# Patient Record
Sex: FEMALE | Race: ASIAN | NOT HISPANIC OR LATINO | Employment: OTHER | URBAN - METROPOLITAN AREA
[De-identification: names, ages, dates, MRNs, and addresses within clinical notes are randomized per-mention and may not be internally consistent; named-entity substitution may affect disease eponyms.]

---

## 2019-06-14 ENCOUNTER — OFFICE VISIT (OUTPATIENT)
Dept: URGENT CARE | Facility: CLINIC | Age: 74
End: 2019-06-14
Payer: MEDICARE

## 2019-06-14 ENCOUNTER — TELEPHONE (OUTPATIENT)
Dept: URGENT CARE | Facility: CLINIC | Age: 74
End: 2019-06-14

## 2019-06-14 VITALS
DIASTOLIC BLOOD PRESSURE: 94 MMHG | TEMPERATURE: 99 F | HEIGHT: 58 IN | BODY MASS INDEX: 22.88 KG/M2 | WEIGHT: 109 LBS | SYSTOLIC BLOOD PRESSURE: 218 MMHG | HEART RATE: 70 BPM | OXYGEN SATURATION: 98 %

## 2019-06-14 DIAGNOSIS — Z76.89 ENCOUNTER TO ESTABLISH CARE: ICD-10-CM

## 2019-06-14 DIAGNOSIS — I10 HYPERTENSION, UNSPECIFIED TYPE: ICD-10-CM

## 2019-06-14 DIAGNOSIS — M79.602 LEFT ARM PAIN: ICD-10-CM

## 2019-06-14 DIAGNOSIS — M77.02 MEDIAL EPICONDYLITIS OF LEFT ELBOW: ICD-10-CM

## 2019-06-14 DIAGNOSIS — S60.222A CONTUSION OF LEFT HAND, INITIAL ENCOUNTER: ICD-10-CM

## 2019-06-14 DIAGNOSIS — S69.92XA HAND INJURY, LEFT, INITIAL ENCOUNTER: Primary | ICD-10-CM

## 2019-06-14 PROCEDURE — 73130 X-RAY EXAM OF HAND: CPT | Mod: FY,LT,S$GLB, | Performed by: RADIOLOGY

## 2019-06-14 PROCEDURE — 99203 OFFICE O/P NEW LOW 30 MIN: CPT | Mod: S$GLB,,, | Performed by: PHYSICIAN ASSISTANT

## 2019-06-14 PROCEDURE — 73130 XR HAND COMPLETE 3 VIEW LEFT: ICD-10-PCS | Mod: FY,LT,S$GLB, | Performed by: RADIOLOGY

## 2019-06-14 PROCEDURE — 73080 XR ELBOW COMPLETE 3 VIEW LEFT: ICD-10-PCS | Mod: FY,LT,S$GLB, | Performed by: RADIOLOGY

## 2019-06-14 PROCEDURE — 99203 PR OFFICE/OUTPT VISIT, NEW, LEVL III, 30-44 MIN: ICD-10-PCS | Mod: S$GLB,,, | Performed by: PHYSICIAN ASSISTANT

## 2019-06-14 PROCEDURE — 73080 X-RAY EXAM OF ELBOW: CPT | Mod: FY,LT,S$GLB, | Performed by: RADIOLOGY

## 2019-06-14 RX ORDER — MULTIVITAMIN
1 TABLET ORAL DAILY
COMMUNITY

## 2019-06-14 NOTE — PATIENT INSTRUCTIONS
Discharge Instructions: Taking Your Blood Pressure  Blood pressure is the force of blood as it moves from the heart through the blood vessels. You can take your own blood pressure reading using a digital monitor. Take readings as often as your healthcare provider instructs. Take your readings each time in the same way, using the same arm. Here are guidelines for taking your blood pressure.  The American Heart Association (AHA) recommends purchasing a blood pressure monitor that is validated and approved by the Association for the Advancement of Medical Instrumentation, the [place] Hypertension Society, and the International Protocol for the Validation of Automated BP Measuring Devices. If the blood pressure monitor is for a senior adult, a pregnant woman, or a child, make certain it is validated for use with such a population. For the most reliable readings, the AHA recommends an automatic, cuff-style, upper arm (bicep) monitor. The readings from finger and wrist monitors are not as reliable as the upper arm monitor.        Step 1. Relax    · Wait at least a half hour after smoking, eating, or exercising. Do not drink coffee, tea, soda, or other caffeinated beverages before checking your blood pressure.   · Sit comfortably at a table. Place the monitor near you.  · Rest for a few minutes before you begin.        Step 2. Wrap the cuff    · Place your arm on the table, palm up. Put your arm in a position that is level with your heart. Wrap the cuff around your upper arm, about an inch above your elbow. Its best to wrap the cuff on bare skin, not over clothing.  · Make sure your cuff fits. If it doesnt wrap around your upper arm, order a larger cuff. A cuff that is too large or too small can result in an inaccurate blood pressure reading.           Step 3. Inflate the cuff    · Pump the cuff until the scale reads 200. If you have a self-inflating cuff, push the button that starts the pump.  · The cuff will  tighten, then loosen.  · The numbers will change. When they stop changing, your blood pressure reading will appear.  · If you get a reading that is too high or too low for you, relax for a few minutes. Then do the test again.    Step 4. Write down the results  · Write down your blood pressure numbers. Paras the date and time. Keep your results in one place, such as a notebook.  · Remove the cuff from your arm. Turn off the machine.  · Take the readings with you to your medical appointments.  · If you start a new blood pressure medicine, or change a blood pressure medicine dose, note the day you started the new drug or dosage on your blood pressure recording sheet. This will help your healthcare provider monitor the effect of medication changes.     Date Last Reviewed: 4/27/2016 © 2000-2016 [employer]. [address] [place] [address]. All rights reserved. This information is not intended as a substitute for professional medical care. Always follow your healthcare professional's instructions.        Hand Contusion  You have a contusion. This is also called a bruise. There is swelling and some bleeding under the skin, but no broken bones. This injury generally takes a few days to a few weeks to heal.  During that time, the bruise will typically change in color from reddish, to purple-blue, to greenish-yellow, then to yellow-brown.  Home care  · Elevate the hand to reduce pain and swelling. As much as possible, sit or lie down with the hand raised about the level of your heart. This is especially important during the first 48 hours.  · Ice the hand to help reduce pain and swelling. Wrap a cold source (ice pack or ice cubes in a plastic bag) in a thin towel. Apply to the bruised area for 20 minutes every 1 to 2 hours the first day. Continue this 3 to 4 times a day until the pain and swelling goes away.  · Unless another medicine was prescribed, you can take acetaminophen, ibuprofen, or  naproxen to control pain. (If you have chronic liver or kidney disease or ever had a stomach ulcer or gastrointestinal bleeding, talk with your doctor before using these medicines.)  Follow up  Follow up with your healthcare provider or our staff as advised. Call if you are not improving within 1 to 2 weeks.  When to seek medical advice   Call your healthcare provider right away if you have any of the following:  · Increased pain or swelling  · Arm becomes cold, blue, numb or tingly  · Signs of infection: Warmth, drainage, or increased redness or pain around the bruise  · Inability to move the injured hand   · Frequent bruising for unknown reasons  Date Last Reviewed: 2/1/2017 © 2000-2017 [employer]. [address] [place] [address], [place] [address]. All rights reserved. This information is not intended as a substitute for professional medical care. Always follow your healthcare professional's instructions.        Understanding Medial Epicondylitis    Several muscles attach to the arm at the elbow joint. The tough bands of tissue that attach muscle to bones are called tendons. The bone in the upper arm has knobs on the farthest end called epicondyles. Tendons attach some arm muscles to these knobs. The tissues in this area can become irritated.  Epicondylitis is the medical term for a painful elbow over the epicondyle. Medial refers to the inner side of the elbow. Medial epicondylitis is sometimes called golfers elbow.     How to say it  [id number]-benito-alok [id number]   Causes of medial epicondylitis  A painful inner elbow may be caused by:  · Using an elbow or hand the same way over and over  · Using poor form or too much force in a sport such as golf, tennis, or baseball  · Lifting too heavy a weight  · Other injuries to the arm or elbow  Symptoms of medial epicondylitis  · Pain or tenderness on the inside of the elbow that may travel down the forearm  · Pain when moving the wrist  · Pain or  weakness when gripping something  · A crackling sound or grating feeling when moving the elbow  Treatment for medial epicondylitis  Treatments may include:  · Avoiding or changing the action that caused the problem. This helps prevent irritating the tissues more.  · Prescription or over-the-counter pain medicines. These help reduce inflammation, swelling, and pain.  · Cold or heat packs. These help reduce pain and swelling.  · Stretching and other exercises. These improve flexibility and strength.  · Physical therapy. This may include exercises or other treatments.  · Injections of medicine. This may relieve symptoms.  If other treatments do not relieve symptoms, you may need surgery.  Possible complications  If you dont give your elbow time to heal, symptoms may return or get worse. Follow your healthcare providers instructions on resting and treating your elbow.     When to call your healthcare provider  Call your healthcare provider right away if you have any of these:  · Fever of 100.4°F (38°C) or higher, or as directed  · Redness, swelling, or warmth that gets worse  · Symptoms that dont get better with prescribed medicines, or get worse  · New symptoms   Date Last Reviewed: 3/10/2016  © 4579-5576 [employer]. [address] [place] [address]. All rights reserved. This information is not intended as a substitute for professional medical care. Always follow your healthcare professional's instructions.      Keep appointment made with primary care.  ER precautions given.  Patient's daughter and patient verbalized understanding importance of blood pressure monitoring.    Please follow up with your Primary care provider within 2-5 days if your signs and symptoms have not resolved or worsen.     If your condition worsens or fails to improve we recommend that you receive another evaluation at the emergency room immediately or contact your primary medical clinic to discuss your concerns.   You  must understand that you have received an Urgent Care treatment only and that you may be released before all of your medical problems are known or treated. You, the patient, will arrange for follow up care as instructed.     RED FLAGS/WARNING SYMPTOMS DISCUSSED WITH PATIENT THAT WOULD WARRANT EMERGENT MEDICAL ATTENTION. PATIENT VERBALIZED UNDERSTANDING.

## 2019-06-14 NOTE — PROGRESS NOTES
"Subjective:       Patient ID: Jairo Pino is a 74 y.o. female.    Vitals:  height is 4' 10" (1.473 m) and weight is 49.4 kg (109 lb). Her temperature is 98.5 °F (36.9 °C). Her blood pressure is 218/94 (abnormal) and her pulse is 70. Her oxygen saturation is 98%.     Chief Complaint: Arm Injury    Arm Injury    The incident occurred more than 1 week ago (2 Weeks ). The incident occurred at home. The injury mechanism was a fall. The pain is present in the left wrist and left hand. The quality of the pain is described as aching, shooting and cramping. Radiates to: left elbow  The pain is at a severity of 0/10 (7 with movement ). The pain is mild. Pertinent negatives include no chest pain. The symptoms are aggravated by movement. She has tried NSAIDs for the symptoms. The treatment provided moderate relief.       Constitution: Negative for appetite change, chills, sweating, fatigue, fever and generalized weakness.   Neck: Negative for neck pain, neck stiffness and neck swelling.   Cardiovascular: Negative for chest pain, leg swelling, palpitations and sob on exertion.   Eyes: Negative for eye redness and blurred vision.   Respiratory: Negative for cough and shortness of breath.    Gastrointestinal: Negative for abdominal pain, nausea, vomiting, constipation and diarrhea.   Musculoskeletal: Positive for pain, joint pain, joint swelling, muscle cramps and muscle ache.       Objective:      Physical Exam   Constitutional: She is oriented to person, place, and time. She appears well-developed and well-nourished. She is cooperative.  Non-toxic appearance. She does not appear ill. No distress.   HENT:   Head: Normocephalic and atraumatic.   Right Ear: Hearing, tympanic membrane, external ear and ear canal normal.   Left Ear: Hearing, tympanic membrane, external ear and ear canal normal.   Nose: Nose normal. No mucosal edema, rhinorrhea or nasal deformity. No epistaxis. Right sinus exhibits no maxillary sinus tenderness " and no frontal sinus tenderness. Left sinus exhibits no maxillary sinus tenderness and no frontal sinus tenderness.   Mouth/Throat: Uvula is midline, oropharynx is clear and moist and mucous membranes are normal. No trismus in the jaw. Normal dentition. No uvula swelling. No posterior oropharyngeal erythema.   Eyes: Conjunctivae and lids are normal. Right eye exhibits no discharge. Left eye exhibits no discharge. No scleral icterus.   Sclera clear bilat   Neck: Trachea normal, normal range of motion, full passive range of motion without pain and phonation normal. Neck supple.   Cardiovascular: Normal rate, regular rhythm, normal heart sounds, intact distal pulses and normal pulses.   Pulmonary/Chest: Effort normal and breath sounds normal. No respiratory distress.   Abdominal: Soft. Normal appearance and bowel sounds are normal. She exhibits no distension, no pulsatile midline mass and no mass. There is no tenderness.   Musculoskeletal: Normal range of motion. She exhibits no edema or deformity.        Left shoulder: She exhibits normal range of motion, no tenderness, no bony tenderness, no swelling, no effusion, no crepitus, no deformity, no laceration, no pain, no spasm, normal pulse and normal strength.        Left elbow: She exhibits normal range of motion, no swelling, no effusion, no deformity and no laceration. Tenderness found. Medial epicondyle tenderness noted. No radial head, no lateral epicondyle and no olecranon process tenderness noted.        Left wrist: She exhibits normal range of motion, no tenderness, no bony tenderness, no swelling, no effusion, no crepitus, no deformity and no laceration.        Left upper arm: She exhibits no tenderness, no bony tenderness, no swelling, no edema, no deformity and no laceration.        Left forearm: She exhibits no tenderness, no bony tenderness, no swelling, no edema, no deformity and no laceration.        Left hand: She exhibits tenderness, bony tenderness and  swelling. She exhibits normal range of motion, normal two-point discrimination, normal capillary refill, no deformity and no laceration. Normal sensation noted. Decreased sensation is not present in the ulnar distribution, is not present in the medial redistribution and is not present in the radial distribution. Normal strength noted. She exhibits no finger abduction, no thumb/finger opposition and no wrist extension trouble.        Hands:  Neurological: She is alert and oriented to person, place, and time. She exhibits normal muscle tone. Coordination normal.   Skin: Skin is warm, dry and intact. She is not diaphoretic. No pallor.   Psychiatric: She has a normal mood and affect. Her speech is normal and behavior is normal. Judgment and thought content normal. Cognition and memory are normal.   Nursing note and vitals reviewed.      XRAY:  No acute fractures of the left hand wrist or elbow.    Assessment:       1. Hand injury, left, initial encounter    2. Left arm pain    3. Hypertension, unspecified type    4. Encounter to establish care    5. Contusion of left hand, initial encounter    6. Medial epicondylitis of left elbow        Plan:         Hand injury, left, initial encounter  -     X-Ray Hand 3 view Left; Future; Expected date: 06/14/2019    Left arm pain  -     X-Ray Elbow Complete Left; Future; Expected date: 06/14/2019    Hypertension, unspecified type  -     Ambulatory referral to Internal Medicine    Encounter to establish care  -     Ambulatory referral to Internal Medicine    Contusion of left hand, initial encounter    Medial epicondylitis of left elbow     Discussed keeping diary log for blood pressure and keep follow-up appointment.  ER precautions discussed with daughter and patient.  They both verbalized understanding.      Discharge Instructions: Taking Your Blood Pressure  Blood pressure is the force of blood as it moves from the heart through the blood vessels. You can take your own blood  pressure reading using a digital monitor. Take readings as often as your healthcare provider instructs. Take your readings each time in the same way, using the same arm. Here are guidelines for taking your blood pressure.  The American Heart Association (AHA) recommends purchasing a blood pressure monitor that is validated and approved by the Association for the Advancement of Medical Instrumentation, the [place] Hypertension Society, and the International Protocol for the Validation of Automated BP Measuring Devices. If the blood pressure monitor is for a senior adult, a pregnant woman, or a child, make certain it is validated for use with such a population. For the most reliable readings, the AHA recommends an automatic, cuff-style, upper arm (bicep) monitor. The readings from finger and wrist monitors are not as reliable as the upper arm monitor.        Step 1. Relax    · Wait at least a half hour after smoking, eating, or exercising. Do not drink coffee, tea, soda, or other caffeinated beverages before checking your blood pressure.   · Sit comfortably at a table. Place the monitor near you.  · Rest for a few minutes before you begin.        Step 2. Wrap the cuff    · Place your arm on the table, palm up. Put your arm in a position that is level with your heart. Wrap the cuff around your upper arm, about an inch above your elbow. Its best to wrap the cuff on bare skin, not over clothing.  · Make sure your cuff fits. If it doesnt wrap around your upper arm, order a larger cuff. A cuff that is too large or too small can result in an inaccurate blood pressure reading.           Step 3. Inflate the cuff    · Pump the cuff until the scale reads 200. If you have a self-inflating cuff, push the button that starts the pump.  · The cuff will tighten, then loosen.  · The numbers will change. When they stop changing, your blood pressure reading will appear.  · If you get a reading that is too high or too low for you,  relax for a few minutes. Then do the test again.    Step 4. Write down the results  · Write down your blood pressure numbers. Paras the date and time. Keep your results in one place, such as a notebook.  · Remove the cuff from your arm. Turn off the machine.  · Take the readings with you to your medical appointments.  · If you start a new blood pressure medicine, or change a blood pressure medicine dose, note the day you started the new drug or dosage on your blood pressure recording sheet. This will help your healthcare provider monitor the effect of medication changes.     Date Last Reviewed: 4/27/2016 © 2000-2016 [employer]. [address] [place] [address], [place] [id number]. All rights reserved. This information is not intended as a substitute for professional medical care. Always follow your healthcare professional's instructions.        Hand Contusion  You have a contusion. This is also called a bruise. There is swelling and some bleeding under the skin, but no broken bones. This injury generally takes a few days to a few weeks to heal.  During that time, the bruise will typically change in color from reddish, to purple-blue, to greenish-yellow, then to yellow-brown.  Home care  · Elevate the hand to reduce pain and swelling. As much as possible, sit or lie down with the hand raised about the level of your heart. This is especially important during the first 48 hours.  · Ice the hand to help reduce pain and swelling. Wrap a cold source (ice pack or ice cubes in a plastic bag) in a thin towel. Apply to the bruised area for 20 minutes every 1 to 2 hours the first day. Continue this 3 to 4 times a day until the pain and swelling goes away.  · Unless another medicine was prescribed, you can take acetaminophen, ibuprofen, or naproxen to control pain. (If you have chronic liver or kidney disease or ever had a stomach ulcer or gastrointestinal bleeding, talk with your doctor before using these  medicines.)  Follow up  Follow up with your healthcare provider or our staff as advised. Call if you are not improving within 1 to 2 weeks.  When to seek medical advice   Call your healthcare provider right away if you have any of the following:  · Increased pain or swelling  · Arm becomes cold, blue, numb or tingly  · Signs of infection: Warmth, drainage, or increased redness or pain around the bruise  · Inability to move the injured hand   · Frequent bruising for unknown reasons  Date Last Reviewed: 2/1/2017 © 2000-2017 [employer]. [address] [place] [address]. All rights reserved. This information is not intended as a substitute for professional medical care. Always follow your healthcare professional's instructions.        Understanding Medial Epicondylitis    Several muscles attach to the arm at the elbow joint. The tough bands of tissue that attach muscle to bones are called tendons. The bone in the upper arm has knobs on the farthest end called epicondyles. Tendons attach some arm muscles to these knobs. The tissues in this area can become irritated.  Epicondylitis is the medical term for a painful elbow over the epicondyle. Medial refers to the inner side of the elbow. Medial epicondylitis is sometimes called golfers elbow.     How to say it  KETURAH-benito-alok [id number]   Causes of medial epicondylitis  A painful inner elbow may be caused by:  · Using an elbow or hand the same way over and over  · Using poor form or too much force in a sport such as golf, tennis, or baseball  · Lifting too heavy a weight  · Other injuries to the arm or elbow  Symptoms of medial epicondylitis  · Pain or tenderness on the inside of the elbow that may travel down the forearm  · Pain when moving the wrist  · Pain or weakness when gripping something  · A crackling sound or grating feeling when moving the elbow  Treatment for medial epicondylitis  Treatments may include:  · Avoiding or  changing the action that caused the problem. This helps prevent irritating the tissues more.  · Prescription or over-the-counter pain medicines. These help reduce inflammation, swelling, and pain.  · Cold or heat packs. These help reduce pain and swelling.  · Stretching and other exercises. These improve flexibility and strength.  · Physical therapy. This may include exercises or other treatments.  · Injections of medicine. This may relieve symptoms.  If other treatments do not relieve symptoms, you may need surgery.  Possible complications  If you dont give your elbow time to heal, symptoms may return or get worse. Follow your healthcare providers instructions on resting and treating your elbow.     When to call your healthcare provider  Call your healthcare provider right away if you have any of these:  · Fever of 100.4°F (38°C) or higher, or as directed  · Redness, swelling, or warmth that gets worse  · Symptoms that dont get better with prescribed medicines, or get worse  · New symptoms   Date Last Reviewed: 3/10/2016  © 3972-7777 [employer]. [address] [place] [address], [place] [address]. All rights reserved. This information is not intended as a substitute for professional medical care. Always follow your healthcare professional's instructions.      Keep appointment made with primary care.  ER precautions given.  Patient's daughter and patient verbalized understanding importance of blood pressure monitoring.    Please follow up with your Primary care provider within 2-5 days if your signs and symptoms have not resolved or worsen.     If your condition worsens or fails to improve we recommend that you receive another evaluation at the emergency room immediately or contact your primary medical clinic to discuss your concerns.   You must understand that you have received an Urgent Care treatment only and that you may be released before all of your medical problems are known or treated. You, the  patient, will arrange for follow up care as instructed.     RED FLAGS/WARNING SYMPTOMS DISCUSSED WITH PATIENT THAT WOULD WARRANT EMERGENT MEDICAL ATTENTION. PATIENT VERBALIZED UNDERSTANDING.

## 2024-08-22 ENCOUNTER — OFFICE VISIT (OUTPATIENT)
Dept: URGENT CARE | Facility: CLINIC | Age: 79
End: 2024-08-22
Payer: MEDICARE

## 2024-08-22 VITALS
HEIGHT: 58 IN | TEMPERATURE: 98 F | BODY MASS INDEX: 22.67 KG/M2 | WEIGHT: 108 LBS | RESPIRATION RATE: 20 BRPM | DIASTOLIC BLOOD PRESSURE: 76 MMHG | HEART RATE: 72 BPM | OXYGEN SATURATION: 95 % | SYSTOLIC BLOOD PRESSURE: 131 MMHG

## 2024-08-22 DIAGNOSIS — H65.03 NON-RECURRENT ACUTE SEROUS OTITIS MEDIA OF BOTH EARS: Primary | ICD-10-CM

## 2024-08-22 DIAGNOSIS — J30.9 ALLERGIC RHINITIS WITH POSTNASAL DRIP: ICD-10-CM

## 2024-08-22 DIAGNOSIS — R09.82 ALLERGIC RHINITIS WITH POSTNASAL DRIP: ICD-10-CM

## 2024-08-22 RX ORDER — LEVOCETIRIZINE DIHYDROCHLORIDE 5 MG/1
5 TABLET, FILM COATED ORAL NIGHTLY
Qty: 30 TABLET | Refills: 11 | Status: SHIPPED | OUTPATIENT
Start: 2024-08-22 | End: 2025-08-22

## 2024-08-22 RX ORDER — DEXAMETHASONE 4 MG/1
8 TABLET ORAL DAILY
Qty: 6 TABLET | Refills: 0 | Status: SHIPPED | OUTPATIENT
Start: 2024-08-22 | End: 2024-08-25

## 2024-08-22 NOTE — PROGRESS NOTES
"Subjective:      Patient ID: Jairo Pino is a 79 y.o. female.    Vitals:  height is 4' 10" (1.473 m) and weight is 49 kg (108 lb 0.4 oz). Her oral temperature is 98 °F (36.7 °C). Her blood pressure is 131/76 and her pulse is 72. Her respiration is 20 and oxygen saturation is 95%.     Chief Complaint: Otalgia    Pt present with RT ear pain. Started today. Tx include nothing at home     Otalgia   There is pain in the right ear. This is a new problem. The current episode started today. The problem occurs constantly. The problem has been unchanged. There has been no fever. The pain is at a severity of 8/10. The pain is moderate. Pertinent negatives include no abdominal pain, coughing, diarrhea, ear discharge, headaches, hearing loss, neck pain, rash, rhinorrhea, sore throat or vomiting. She has tried nothing for the symptoms. The treatment provided no relief.     HENT:  Positive for ear pain. Negative for ear discharge, hearing loss and sore throat.    Neck: Negative for neck pain.   Respiratory:  Negative for cough.    Gastrointestinal:  Negative for abdominal pain, vomiting and diarrhea.   Skin:  Negative for rash.   Neurological:  Negative for headaches.      Objective:     Physical Exam   Constitutional: She is oriented to person, place, and time. She appears well-developed. She is cooperative.  Non-toxic appearance. She does not appear ill. No distress.   HENT:   Head: Normocephalic and atraumatic.   Ears:   Right Ear: Hearing and external ear normal. A middle ear effusion is present.   Left Ear: Hearing and external ear normal. A middle ear effusion is present.   Nose: Congestion present. No mucosal edema, rhinorrhea or nasal deformity. No epistaxis. Right sinus exhibits no maxillary sinus tenderness and no frontal sinus tenderness. Left sinus exhibits no maxillary sinus tenderness and no frontal sinus tenderness.   Mouth/Throat: Uvula is midline and mucous membranes are normal. No trismus in the jaw. " Normal dentition. No uvula swelling. Posterior oropharyngeal erythema present. No oropharyngeal exudate or posterior oropharyngeal edema.   Eyes: Conjunctivae and lids are normal. No scleral icterus.   Neck: Trachea normal and phonation normal. Neck supple. No edema present. No erythema present. No neck rigidity present.   Cardiovascular: Normal rate, regular rhythm, normal heart sounds and normal pulses.   Pulmonary/Chest: Effort normal and breath sounds normal. No respiratory distress. She has no decreased breath sounds. She has no rhonchi.   Abdominal: Normal appearance.   Musculoskeletal: Normal range of motion.         General: No deformity. Normal range of motion.   Neurological: She is alert and oriented to person, place, and time. She exhibits normal muscle tone. Coordination normal.   Skin: Skin is warm, dry, intact, not diaphoretic and not pale.   Psychiatric: Her speech is normal and behavior is normal. Judgment and thought content normal.   Nursing note and vitals reviewed.      Assessment:     1. Non-recurrent acute serous otitis media of both ears    2. Allergic rhinitis with postnasal drip        Plan:       Non-recurrent acute serous otitis media of both ears  -     dexAMETHasone (DECADRON) 4 MG Tab; Take 2 tablets (8 mg total) by mouth once daily. for 3 doses  Dispense: 6 tablet; Refill: 0  -     levocetirizine (XYZAL) 5 MG tablet; Take 1 tablet (5 mg total) by mouth every evening.  Dispense: 30 tablet; Refill: 11    Allergic rhinitis with postnasal drip  -     dexAMETHasone (DECADRON) 4 MG Tab; Take 2 tablets (8 mg total) by mouth once daily. for 3 doses  Dispense: 6 tablet; Refill: 0  -     levocetirizine (XYZAL) 5 MG tablet; Take 1 tablet (5 mg total) by mouth every evening.  Dispense: 30 tablet; Refill: 11        Thank you for choosing [facility]!     Our goal in the Urgent Care is to always provide outstanding medical care. You may receive a survey by mail or e-mail in the next week  regarding your experience today. We would greatly appreciate you completing and returning the survey. Your feedback provides us with a way to recognize our staff who provide very good care, and it helps us learn how to improve when your experience was below our aspiration of excellence.       We appreciate you trusting us with your medical care. We hope you feel better soon. We will be happy to take care of you for all of your future medical needs.  You must understand that you've received an Urgent Care treatment only and that you may be released before all your medical problems are known or treated. You, the patient, will arrange for follow up care as instructed.  Follow up with your PCP or specialty clinic as directed in the next 1-2 weeks if not improved or as needed.  You can call [phone] to schedule an appointment with the appropriate provider.  Another option is to follow up with [email or web address] ([email or web address]) virtually for quick simple medical advice.  If your condition worsens we recommend that you receive another evaluation at the emergency room immediately or contact your primary medical clinics after hours call service to discuss your concerns.  Please return here or go to the Emergency Department for any concerns or worsening of condition.      *If you were prescribed a narcotic or controlled medication, do not drive or operate heavy equipment or machinery while taking these medications.